# Patient Record
Sex: MALE | Race: WHITE
[De-identification: names, ages, dates, MRNs, and addresses within clinical notes are randomized per-mention and may not be internally consistent; named-entity substitution may affect disease eponyms.]

---

## 2018-04-09 ENCOUNTER — HOSPITAL ENCOUNTER (OUTPATIENT)
Dept: HOSPITAL 45 - C.LABMFLN | Age: 63
Discharge: HOME | End: 2018-04-09
Attending: FAMILY MEDICINE
Payer: COMMERCIAL

## 2018-04-09 DIAGNOSIS — C61: Primary | ICD-10-CM

## 2018-04-09 LAB
BUN SERPL-MCNC: 20 MG/DL (ref 7–18)
CREAT SERPL-MCNC: 0.81 MG/DL (ref 0.6–1.4)

## 2018-04-17 ENCOUNTER — HOSPITAL ENCOUNTER (OUTPATIENT)
Dept: HOSPITAL 45 - C.MRI | Age: 63
Discharge: HOME | End: 2018-04-17
Attending: UROLOGY
Payer: COMMERCIAL

## 2018-04-17 DIAGNOSIS — C61: Primary | ICD-10-CM

## 2018-04-17 LAB
BASOPHILS # BLD: 0.01 K/UL (ref 0–0.2)
BASOPHILS NFR BLD: 0.2 %
BUN SERPL-MCNC: 14 MG/DL (ref 7–18)
CALCIUM SERPL-MCNC: 9.2 MG/DL (ref 8.5–10.1)
CO2 SERPL-SCNC: 29 MMOL/L (ref 21–32)
CREAT SERPL-MCNC: 0.88 MG/DL (ref 0.6–1.4)
EOS ABS #: 0.05 K/UL (ref 0–0.5)
EOSINOPHIL NFR BLD AUTO: 262 K/UL (ref 130–400)
GLUCOSE SERPL-MCNC: 99 MG/DL (ref 70–99)
HCT VFR BLD CALC: 44 % (ref 42–52)
HGB BLD-MCNC: 15 G/DL (ref 14–18)
IG#: 0.01 K/UL (ref 0–0.02)
IMM GRANULOCYTES NFR BLD AUTO: 19.4 %
LYMPHOCYTES # BLD: 1.05 K/UL (ref 1.2–3.4)
MCH RBC QN AUTO: 30.4 PG (ref 25–34)
MCHC RBC AUTO-ENTMCNC: 34.1 G/DL (ref 32–36)
MCV RBC AUTO: 89.1 FL (ref 80–100)
MONO ABS #: 0.4 K/UL (ref 0.11–0.59)
MONOCYTES NFR BLD: 7.4 %
NEUT ABS #: 3.88 K/UL (ref 1.4–6.5)
NEUTROPHILS # BLD AUTO: 0.9 %
NEUTROPHILS NFR BLD AUTO: 71.9 %
PMV BLD AUTO: 9.2 FL (ref 7.4–10.4)
POTASSIUM SERPL-SCNC: 4.2 MMOL/L (ref 3.5–5.1)
RED CELL DISTRIBUTION WIDTH CV: 12.7 % (ref 11.5–14.5)
RED CELL DISTRIBUTION WIDTH SD: 40.9 FL (ref 36.4–46.3)
SODIUM SERPL-SCNC: 135 MMOL/L (ref 136–145)
WBC # BLD AUTO: 5.4 K/UL (ref 4.8–10.8)

## 2018-04-17 NOTE — DIAGNOSTIC IMAGING REPORT
PELVIC COMBO



CLINICAL HISTORY: 62 years-old Male presenting with CARCINOMA OF THE PROSTATE. 



TECHNIQUE: Multisequence, multiplanar MR imaging of the pelvis was performed

before and after the administration of intravenous contrast. IV contrast: 6.5 mL

of Gadavist. 



COMPARISON: CT from 12/30/2017.



FINDINGS:



Localizer images: Unremarkable.



Bone marrow signal intensity normal.



Visualized portion of the scrotum normal. Fat-containing right inguinal hernia.



Allowing for underdistention, mild circumferential bladder wall thickening could

suggest chronic bladder outlet obstruction.



Prostate demonstrates mild changes of benign prostatic hyperplasia. No evidence

of extraprostatic extension. Few scattered T1 hyperintense foci in the prostate

on precontrast imaging may relate to calcification. No significant evidence of

hemorrhage.



No pelvic lymphadenopathy.



IMPRESSION:

1.  Benign prostatic hyperplasia. No kamari evidence of extraprostatic extension

or pelvic lymphadenopathy.







Electronically signed by:  Jarret Aguilera M.D.

4/17/2018 2:38 PM



Dictated Date/Time:  4/17/2018 2:31 PM

## 2018-04-17 NOTE — DIAGNOSTIC IMAGING REPORT
CHEST 2 VIEWS ROUTINE



CLINICAL HISTORY: PAT preoperative evaluation



COMPARISON STUDY:  No previous studies for comparison.



FINDINGS: The bones soft tissues and hemidiaphragms are normal. The

cardiomediastinal silhouette is normal. The lungs are clear. The pulmonary

vasculature is normal. 



IMPRESSION:  Negative chest. 











The above report was generated using voice recognition software.  It may contain

grammatical, syntax or spelling errors.









Electronically signed by:  Jace Sarmiento M.D.

4/17/2018 11:10 AM



Dictated Date/Time:  4/17/2018 11:10 AM

## 2018-05-01 ENCOUNTER — HOSPITAL ENCOUNTER (INPATIENT)
Dept: HOSPITAL 45 - C.ACU | Age: 63
LOS: 1 days | Discharge: HOME | DRG: 708 | End: 2018-05-02
Attending: UROLOGY | Admitting: UROLOGY
Payer: COMMERCIAL

## 2018-05-01 VITALS
HEART RATE: 55 BPM | OXYGEN SATURATION: 97 % | DIASTOLIC BLOOD PRESSURE: 61 MMHG | TEMPERATURE: 97.7 F | SYSTOLIC BLOOD PRESSURE: 100 MMHG

## 2018-05-01 VITALS
OXYGEN SATURATION: 97 % | SYSTOLIC BLOOD PRESSURE: 117 MMHG | HEART RATE: 52 BPM | TEMPERATURE: 98.06 F | DIASTOLIC BLOOD PRESSURE: 86 MMHG

## 2018-05-01 VITALS
WEIGHT: 149.03 LBS | WEIGHT: 149.03 LBS | HEIGHT: 66 IN | BODY MASS INDEX: 23.95 KG/M2 | BODY MASS INDEX: 23.95 KG/M2 | BODY MASS INDEX: 23.95 KG/M2 | HEIGHT: 66 IN

## 2018-05-01 VITALS
HEART RATE: 56 BPM | SYSTOLIC BLOOD PRESSURE: 94 MMHG | OXYGEN SATURATION: 98 % | TEMPERATURE: 97.7 F | DIASTOLIC BLOOD PRESSURE: 62 MMHG

## 2018-05-01 VITALS
SYSTOLIC BLOOD PRESSURE: 93 MMHG | HEART RATE: 65 BPM | OXYGEN SATURATION: 99 % | TEMPERATURE: 98.42 F | DIASTOLIC BLOOD PRESSURE: 61 MMHG

## 2018-05-01 VITALS
TEMPERATURE: 97.34 F | SYSTOLIC BLOOD PRESSURE: 94 MMHG | HEART RATE: 58 BPM | DIASTOLIC BLOOD PRESSURE: 62 MMHG | OXYGEN SATURATION: 97 %

## 2018-05-01 VITALS
DIASTOLIC BLOOD PRESSURE: 60 MMHG | SYSTOLIC BLOOD PRESSURE: 96 MMHG | OXYGEN SATURATION: 95 % | TEMPERATURE: 98.24 F | HEART RATE: 61 BPM

## 2018-05-01 VITALS
DIASTOLIC BLOOD PRESSURE: 66 MMHG | TEMPERATURE: 98.78 F | SYSTOLIC BLOOD PRESSURE: 106 MMHG | OXYGEN SATURATION: 94 % | HEART RATE: 59 BPM

## 2018-05-01 DIAGNOSIS — C61: Primary | ICD-10-CM

## 2018-05-01 DIAGNOSIS — K42.9: ICD-10-CM

## 2018-05-01 LAB
BUN SERPL-MCNC: 21 MG/DL (ref 7–18)
CALCIUM SERPL-MCNC: 8.1 MG/DL (ref 8.5–10.1)
CO2 SERPL-SCNC: 26 MMOL/L (ref 21–32)
CREAT SERPL-MCNC: 0.83 MG/DL (ref 0.6–1.4)
EOSINOPHIL NFR BLD AUTO: 217 K/UL (ref 130–400)
GLUCOSE SERPL-MCNC: 165 MG/DL (ref 70–99)
HCT VFR BLD CALC: 38.6 % (ref 42–52)
HGB BLD-MCNC: 13.3 G/DL (ref 14–18)
INR PPP: 1.1 (ref 0.9–1.1)
MCH RBC QN AUTO: 30.1 PG (ref 25–34)
MCHC RBC AUTO-ENTMCNC: 34.2 G/DL (ref 32–36)
MCV RBC AUTO: 88 FL (ref 80–100)
PMV BLD AUTO: 8.7 FL (ref 7.4–10.4)
POTASSIUM SERPL-SCNC: 3.3 MMOL/L (ref 3.5–5.1)
PTT PATIENT: 24.4 SECONDS (ref 21–31)
RED CELL DISTRIBUTION WIDTH CV: 12.8 % (ref 11.5–14.5)
RED CELL DISTRIBUTION WIDTH SD: 41.5 FL (ref 36.4–46.3)
SODIUM SERPL-SCNC: 140 MMOL/L (ref 136–145)
WBC # BLD AUTO: 12.32 K/UL (ref 4.8–10.8)

## 2018-05-01 PROCEDURE — 0VT04ZZ RESECTION OF PROSTATE, PERCUTANEOUS ENDOSCOPIC APPROACH: ICD-10-PCS | Performed by: UROLOGY

## 2018-05-01 PROCEDURE — 0VT34ZZ RESECTION OF BILATERAL SEMINAL VESICLES, PERCUTANEOUS ENDOSCOPIC APPROACH: ICD-10-PCS | Performed by: UROLOGY

## 2018-05-01 PROCEDURE — 07BC4ZX EXCISION OF PELVIS LYMPHATIC, PERCUTANEOUS ENDOSCOPIC APPROACH, DIAGNOSTIC: ICD-10-PCS | Performed by: UROLOGY

## 2018-05-01 PROCEDURE — 0WQF0ZZ REPAIR ABDOMINAL WALL, OPEN APPROACH: ICD-10-PCS | Performed by: UROLOGY

## 2018-05-01 PROCEDURE — 8E0W4CZ ROBOTIC ASSISTED PROCEDURE OF TRUNK REGION, PERCUTANEOUS ENDOSCOPIC APPROACH: ICD-10-PCS | Performed by: UROLOGY

## 2018-05-01 RX ADMIN — HEPARIN SODIUM SCH UNIT: 10000 INJECTION, SOLUTION INTRAVENOUS; SUBCUTANEOUS at 18:55

## 2018-05-01 RX ADMIN — ACETAMINOPHEN SCH MG: 500 TABLET, COATED ORAL at 23:38

## 2018-05-01 RX ADMIN — ACETAMINOPHEN SCH MG: 500 TABLET, COATED ORAL at 17:47

## 2018-05-01 RX ADMIN — DOCUSATE SODIUM SCH MG: 100 CAPSULE, LIQUID FILLED ORAL at 20:53

## 2018-05-01 RX ADMIN — SODIUM CHLORIDE, SODIUM LACTATE, POTASSIUM CHLORIDE, AND CALCIUM CHLORIDE SCH MLS/HR: 600; 310; 30; 20 INJECTION, SOLUTION INTRAVENOUS at 17:03

## 2018-05-01 RX ADMIN — SODIUM CHLORIDE, SODIUM LACTATE, POTASSIUM CHLORIDE, AND CALCIUM CHLORIDE SCH MLS/HR: 600; 310; 30; 20 INJECTION, SOLUTION INTRAVENOUS at 23:37

## 2018-05-01 RX ADMIN — CEFAZOLIN SCH MLS/MIN: 10 INJECTION, POWDER, FOR SOLUTION INTRAVENOUS at 23:37

## 2018-05-01 RX ADMIN — CEFAZOLIN SCH MLS/MIN: 10 INJECTION, POWDER, FOR SOLUTION INTRAVENOUS at 15:42

## 2018-05-01 NOTE — ANESTHESIOLOGY PROGRESS NOTE
Anesthesia Post Op Note


Date & Time


May 1, 2018 at 13:08





Vital Signs


Pain Intensity:  3





Vital Signs Past 12 Hours








  Date Time  Temp Pulse Resp B/P (MAP) Pulse Ox O2 Delivery O2 Flow Rate FiO2


 


5/1/18 12:47  51 19     


 


5/1/18 12:47  51 19  99   


 


5/1/18 12:46    98/60    


 


5/1/18 12:44 36.4 51 16 98/60 (72) 100 Nasal Cannula 2 


 


5/1/18 12:42  53 17 101/57 96   


 


5/1/18 12:42  52 17     


 


5/1/18 12:41    94/59    


 


5/1/18 12:37  52 13     


 


5/1/18 12:37  52 13  98   


 


5/1/18 12:36    99/62    


 


5/1/18 12:32  54 23     


 


5/1/18 12:32  54 23  93   


 


5/1/18 12:31    102/62    


 


5/1/18 12:27  51 18  97   


 


5/1/18 12:27  52 18     


 


5/1/18 12:26  53 17 94/59 100   


 


5/1/18 12:26  53 17     


 


5/1/18 12:21  53 23     


 


5/1/18 12:21  53 23 94/63 94   


 


5/1/18 12:18    105/65    


 


5/1/18 12:17    92/57    


 


5/1/18 12:16  54 17 90/58 100   


 


5/1/18 12:16  53 17     


 


5/1/18 12:11  55 20     


 


5/1/18 12:11  55 20 102/61 99   


 


5/1/18 12:09    102/61    


 


5/1/18 12:07    100/58    


 


5/1/18 12:06 36.0 58 18 102/61 (66) 100 Oxymask 10 


 


5/1/18 12:06  64      


 


5/1/18 12:06  64   100   


 


5/1/18 05:47 36.7 52 18 117/86 (96) 97 Room Air  











Notes


Mental Status:  alert / awake / arousable, participated in evaluation


Pt Amnestic to Procedure:  Yes


Nausea / Vomiting:  adequately controlled


Pain:  adequately controlled


Airway Patency, RR, SpO2:  stable & adequate


BP & HR:  stable & adequate


Hydration State:  stable & adequate


Anesthetic Complications:  no major complications apparent

## 2018-05-01 NOTE — [facility] OPERATIVE REPORT
Operative Report


Operative Date


May 1, 2018.





Pre-Operative Diagnosis





1. Prostate Cancer





2. Umbilical hernia





Post-Operative Diagnosis





1. Prostate Cancer





2.  Umbilical hernia





Procedure(s) Performed





Robotic Assisted Laparoscopic Prostatectomy; closure of umbilical hernia





Surgeon


Dr. Andre Armenta





Assistant Surgeon(s)


ADRIAN Dudley





Estimated Blood Loss


175 ML





Specimens





Permanent:





A. Right Pelvic Lymph Node (fresh)





B. Dionne-prostatic fat





C. Left Pelvic Lymph Node (fresh)





D. Prostate and Seminal Vesicle





Drains


David catheter





Anesthesia Type


General





Complication(s)


none





Disposition


yes


Recovery Room / PACU (stable)





Indications


Prostate cancer





Description of Procedure


The patient was identified in the preoperative holding area, appropriate 

informed consents were reviewed and completed, and he was transported to the 

operating suite.  Subcutaneous heparin was administered in the pre-operative 

holding area.  Upon arrival in the operating suite, he received appropriate 

antibiotics and general anesthesia.  He was positioned in dorsal lithotomy, a B&

O suppository was inserted after digital rectal exam, and he was prepped and 

draped in standard fashion.





A David catheter was inserted in the sterile field.  A Veress needle was passed 

per umbilicus with uniform insufflation of the abdomen to 12mmHg.  He was 

placed in steep Trendelenburg position.  A periumbilical incision was then made 

to accommodate a 12mm Visiport with 10mm 0degree laparoscope.  Inspection of 

the abdomen was carried out, and there was no evidence of traumatic entry or 

injury secondary to the Veress needle.  After confirming a clear anterior 

abdominal wall, ports were subsequently placed in standard robotic 

prostatectomy fashion without incident.





To begin the robotic portion of the case, the left lateral aspect of the 

sigmoid was mobilized off of the left pelvic side wall to allow the pouch of 

Zachary to be appropriately visualized.  Of note, he is status post prior left 

inguinal hernia repair, and mesh was visible protruding from the left internal 

ring.  There was moderate adhesive disease around this and I carefully 

dissected all adjacent tissues off of the mesh.





The medial umbilical ligaments were then controlled with bipolar electrocautery 

just inferior to the umbilicus.  Following cauterization, they were divided 

utilizing monopolar cautery. A peritoneal incision was carried from this 

location to the medial aspect of the internal inguinal rings bilaterally with 

care to avoid opening through the ring.  This incision was concluded when the 

vas deferens was reached.  Dissection of the bladder and prostate off of the 

posterior aspect of the pubic arch was completed allowing full visualization of 

the prostate.  The fat overlying the prostate was removed en bloc and passed 

off the table as a specimen labeled "periprostatic fat".  The endopelvic fascia 

was cleared during this portion of the procedure, and subsequently opened - 

first on the right and then the left.  The incision through the endopelvic 

fascia began near the prostate-bladder junction and was carried to the apex 

with extreme care to preserve all lateral levator musculature as well as the 

periurethral musculature and sphincter complex.  The puboprostatic ligaments 

were thinned slightly bilaterally before placing a 0-Vicryl figure of 8 stitch 

around the DVC.





The lymph node dissection was then conducted.  External iliac vessels were 

identified on the pelvic side wall. The packet of fat and lymphatic tissue that 

resides just under the iliac vein was elevated and  off of the vein 

with a split and roll technique.  The packet was dissected laterally to the 

circumflex vein and distally to the obturator nerve which was preserved.  The 

proximal aspect of the packet was carried towards the bifurcation of the iliac 

vessels. A combination of monopolar and bipolar cautery were used  to assist 

with control.  Clips were placed at the proximal and distal aspects of the 

packet prior to transection.  After completing the dissection on both sides, 

the packets were collected and passed off of the table as specimens labeled 

"pelvic lymph nodes".





My attention then returned to the prostate, with identification of the bladder 

neck aided by gentle traction on the David catheter and lateral to medial 

pressure at the presumed level of the bladder neck with the robotic 

instruments.  An anterior cystotomy was made, the David balloon deflated and 

the catheter guided through the incision to allow anterior retraction.  I 

attempted to preserve maximal bladder neck musculature as I circumferentially 

dissected around the bladder neck.  After incision through the posterior aspect 

of the mucosa, the dissection was carried through detrusor muscle until the 

bilateral ampullae of the vasa were identified.  After identifying the vasa, I 

developed a pedicle packet on each side to help flatten the dissection and 

placed Weck clips across the most proximal and superficial aspects of these 

packets adjacent to the bladder.  The packets were then divided allowing easier 

visualization of the vasa and posterior aspect of the prostate.





Vasa were each dissected before being transected.  These were used to further 

aide in anterior retraction as the bilateral seminal vesicals were dissected 

with very judicious use of bipolar electrocautery.  Following SV dissection, a 

posterior plane behind the prostate was developed  - splitting Denonvilliers's 

fascia.  This dissection was carried as far as possible towards the apex as 

well as far as possible laterally.





An incision in the lateral prostatic fascia was then made bilaterally to 

facilitate control of the vascular pedicles.  The pedicles were each controlled 

with a series of Weck clips.  The neurovascular bundles were identified and 

preserved, with increased caution adjacent to the area of cancer identified on 

biopsy.





The apical attachments of the prostate were remaining at that stage.  The DVC 

was divided with bipolar electrocautery.  Dionne-prostatic tissue incised with 

sharp dissection and monopolar cautery.  Maximal urethral length was preserved 

before dividing the urethra sharply.





The prostate was entirely freed at that point, and collected in an EndoCatch 

bag before being moved out of the field of vision. Hemostasis was confirmed and 

anastomosis of the bladder and urethra was completed utilizing a double armed V-

Lock stitch.  A new David catheter was inserted and the anastomosis tested with 

irrigation. There was no evidence of leak.  Tisseel coagulant was placed over 

the lymph node dissection areas as well as around the anastomosis before 

additional placement of FloSeal coagulant around the anastomosis.





The robot was undocked, the specimen extracted through expansion of the dionne-

umbilical camera port.  Of note, he has small umbilical hernia, and I elected 

to open the fascia through this hernia and incorporated into the closure to 

completely repair the hernia.  The fascia was closed with a series of 0-PDS 

figure of 8 stitches.  The right assistant port was closed in two layers  - 

with a figure of 8 0-Vicryl to reapproximate the fascia followed by 4-0 

Monocryl to close the skin. Monocryl was used to close all other skin 

incisions.  All wounds were dressed with Dermabond.





The case was concluded and the patient taken to the PACU in stable condition.


I attest to the content of the Intraoperative Record and any orders documented 

therein.  Any exceptions are noted below.

## 2018-05-01 NOTE — [facility] POST OPERATIVE BRIEF NOTE
Immediate Operative Summary


Operative Date


May 1, 2018.





Pre-Operative Diagnosis





1. Prostate Cancer





2. Elevated Prostate Specific Antigen





Post-Operative Diagnosis





1. Prostate Cancer





2. Elevated Prostate Specific Antigen





Procedure(s) Performed





Robotic Assisted Laparoscopic Prostatectomy





Surgeon


Dr. Andre Armenta





Assistant Surgeon(s)


ADRIAN Dudley





Estimated Blood Loss


175 ML





Findings


Consistent with Post-Op Diagnosis





Specimens





Permanent:





A. Right Pelvic Lymph Node (fresh)





B. Dionne-prostatic fat





C. Left Pelvic Lymph Node (fresh)





D. Prostate and Seminal Vesicle





Anesthesia Type


General





Complication(s)


none





Disposition


Accompanied Pt To Recover:  yes


Disposition:  Recovery Room / PACU (stable)

## 2018-05-01 NOTE — DISCHARGE INSTRUCTIONS
Discharge Instructions


Date of Service


May 1, 2018.





Admission


Reason for Admission:  Prostate Cancer





Discharge


Discharge Diagnosis / Problem:  Prostate Cancer





Discharge Goals


Goal(s):  Decrease discomfort, Improve disease control, Therapeutic intervention





Activity Recommendations


Activity Limitations:  per Instructions/Follow-up section


Shower/Bathe:  tomorrow





1.  Do not lift >15lbs x 6 weeks. 





2.  No heavy exercise x 6 weeks. You may engage in light activity such as 

walking and stairs as tolerated. 





3.  No sexual intercourse until cleared by Dr. Armenta. 





4.  Do not drive x 1 week. Do not drive while taking narcotics. 





5.  You have been prescribed the antibiotic Ciprofloxacin. Please start this 2 

days prior to bourne catheter removal. Finish all of the antibiotic you have 

been prescribed. 





6.  Immediately call our office at [phone] if your catheter is removed for 

any reason. 





7.  Follow-up as scheduled. Please call our office at [phone] if you need 

to reschedule for any reason.


. 











.





Current Hospital Diet


Patient's current hospital diet: Clear Liquid Diet





Discharge Diet


Recommended Diet:  Regular Diet





Procedures


Procedures Performed:  


Robotic Assisted Laparoscopic Prostatectomy





Pending Studies


Studies pending at discharge:  yes


List of pending studies:  


prostate and lymph node pathology





Medical Emergencies








.


Who to Call and When:





Medical Emergencies:  If at any time you feel your situation is an emergency, 

please call 911 immediately.





.





Non-Emergent Contact


Non-Emergency issues call your:  Urologist


Call Non-Emergent contact if:  temperature is above 101.5, your pain is not 

controlled, your pain is worsening, your pain is unusual for you, your pain is 

concerning you, wound has increased drainage, wound has increased redness, 

wound has increased pain, you have any medication questions





.


.








"Provider Documentation" section prepared by Evelyne Back.








.





[place] Drug Monitoring Program


Search Results:  patient reviewed within database, no issues identified

## 2018-05-02 VITALS
SYSTOLIC BLOOD PRESSURE: 111 MMHG | TEMPERATURE: 98.42 F | HEART RATE: 62 BPM | DIASTOLIC BLOOD PRESSURE: 65 MMHG | OXYGEN SATURATION: 96 %

## 2018-05-02 VITALS
OXYGEN SATURATION: 97 % | HEART RATE: 70 BPM | TEMPERATURE: 98.78 F | SYSTOLIC BLOOD PRESSURE: 130 MMHG | DIASTOLIC BLOOD PRESSURE: 72 MMHG

## 2018-05-02 VITALS
DIASTOLIC BLOOD PRESSURE: 65 MMHG | TEMPERATURE: 99.14 F | SYSTOLIC BLOOD PRESSURE: 120 MMHG | OXYGEN SATURATION: 93 % | HEART RATE: 61 BPM

## 2018-05-02 VITALS
HEART RATE: 62 BPM | TEMPERATURE: 98.42 F | DIASTOLIC BLOOD PRESSURE: 65 MMHG | SYSTOLIC BLOOD PRESSURE: 111 MMHG | OXYGEN SATURATION: 96 %

## 2018-05-02 LAB
BASOPHILS # BLD: 0.01 K/UL (ref 0–0.2)
BASOPHILS NFR BLD: 0.1 %
BUN SERPL-MCNC: 13 MG/DL (ref 7–18)
CALCIUM SERPL-MCNC: 8.3 MG/DL (ref 8.5–10.1)
CO2 SERPL-SCNC: 31 MMOL/L (ref 21–32)
CREAT SERPL-MCNC: 0.83 MG/DL (ref 0.6–1.4)
EOS ABS #: 0.01 K/UL (ref 0–0.5)
EOSINOPHIL NFR BLD AUTO: 218 K/UL (ref 130–400)
GLUCOSE SERPL-MCNC: 101 MG/DL (ref 70–99)
HCT VFR BLD CALC: 36.3 % (ref 42–52)
HGB BLD-MCNC: 12.3 G/DL (ref 14–18)
IG#: 0.02 K/UL (ref 0–0.02)
IMM GRANULOCYTES NFR BLD AUTO: 13.9 %
LYMPHOCYTES # BLD: 1.43 K/UL (ref 1.2–3.4)
MCH RBC QN AUTO: 30.2 PG (ref 25–34)
MCHC RBC AUTO-ENTMCNC: 33.9 G/DL (ref 32–36)
MCV RBC AUTO: 89.2 FL (ref 80–100)
MONO ABS #: 0.91 K/UL (ref 0.11–0.59)
MONOCYTES NFR BLD: 8.8 %
NEUT ABS #: 7.91 K/UL (ref 1.4–6.5)
NEUTROPHILS # BLD AUTO: 0.1 %
NEUTROPHILS NFR BLD AUTO: 76.9 %
PMV BLD AUTO: 9.1 FL (ref 7.4–10.4)
POTASSIUM SERPL-SCNC: 3.9 MMOL/L (ref 3.5–5.1)
RED CELL DISTRIBUTION WIDTH CV: 13.1 % (ref 11.5–14.5)
RED CELL DISTRIBUTION WIDTH SD: 42.7 FL (ref 36.4–46.3)
SODIUM SERPL-SCNC: 140 MMOL/L (ref 136–145)
WBC # BLD AUTO: 10.29 K/UL (ref 4.8–10.8)

## 2018-05-02 RX ADMIN — SODIUM CHLORIDE, SODIUM LACTATE, POTASSIUM CHLORIDE, AND CALCIUM CHLORIDE SCH MLS/HR: 600; 310; 30; 20 INJECTION, SOLUTION INTRAVENOUS at 05:53

## 2018-05-02 RX ADMIN — CEFAZOLIN SCH MLS/MIN: 10 INJECTION, POWDER, FOR SOLUTION INTRAVENOUS at 07:42

## 2018-05-02 RX ADMIN — ACETAMINOPHEN SCH MG: 500 TABLET, COATED ORAL at 11:26

## 2018-05-02 RX ADMIN — ACETAMINOPHEN SCH MG: 500 TABLET, COATED ORAL at 05:53

## 2018-05-02 RX ADMIN — DOCUSATE SODIUM SCH MG: 100 CAPSULE, LIQUID FILLED ORAL at 07:51

## 2018-05-02 RX ADMIN — HEPARIN SODIUM SCH UNIT: 10000 INJECTION, SOLUTION INTRAVENOUS; SUBCUTANEOUS at 06:15

## 2018-05-02 NOTE — CLINICAL DOCUMENTATION QUERY
**** CLINICAL DOCUMENTATION QUERY****



Dr. HELM, 



In your clinical opinion is this patient being managed for:

    

                        (  ) Acute blood loss anemia



                        (  ) Not Agree



                        (x  ) Other explanation of clinical findings (Please Explain. If 
no explanation given, this would be considered a no response.) (dilution from IVF and 
modest operative blood loss)



                        (  ) Unable to determine 



                        (  ) Need to Discuss (Please call CDS via extension or qliq. If no 
interaction occurs this is considered a no response.)

                        

The medical record reflects the following clinical findings, treatment, and risk factors.  
  



Clinical Indicators: 63 yo male presenting with prostate cancer and an umbilical hernia. 
Baseline Hgb 15/Hct 44 dropping to 12.3/36.3 postoperatively. EBL of 175 cc

Treatment: IV fluids, serial CBC's

Risk Factors: surgical blood loss



Please clarify and document your clinical opinion in the progress notes and discharge 
summary. Terms such as "probable", "suspected", "likely", "questionable", "possible", or 
"still to be ruled out" are acceptable. 



*****IF IN AGREEMENT, YOU MUST DOCUMENT ABOVE DIAGNOSTIC STATEMENT IN DAILY PROGRESS NOTES 
AND DISCHARGE SUMMARY. This document is not part of the patient's record.*****

Thank You, Poly Recio [phone]

## 2018-05-02 NOTE — PROGRESS NOTE
Subjective


Date of Service:


May 2, 2018.


Subjective


Pt evaluation today including:  conversation w/ patient, physical exam, lab 

review


Voiding:  bourne catheter in place


Progressing very well post operatively


- ambulating


- tolerating a diet


- minimal pain


- very content with his current status





Review of Systems


Abdomen:  + pain (appropriate post operative pain)





Objective


Vital Signs











  Date Time  Temp Pulse Resp B/P (MAP) Pulse Ox O2 Delivery O2 Flow Rate FiO2


 


5/2/18 08:22 36.9 62 16 111/65 (80) 96 Room Air  


 


5/2/18 03:02 37.3 61 15 120/65 (83) 93 Room Air  


 


5/1/18 23:47      Room Air  


 


5/1/18 23:35 37.1 59 16 106/66 (79) 94 Room Air  


 


5/1/18 16:56 36.9 65 18 93/61 (72) 99 Room Air  


 


5/1/18 15:40 36.8 61 18 96/60 (72) 95 Room Air  


 


5/1/18 15:20      Room Air  


 


5/1/18 14:25 36.3 58 19 94/62 (73) 97 Nasal Cannula 2.0 


 


5/1/18 13:51 36.5 56 15 94/62 (73) 98 Nasal Cannula 2.0 


 


5/1/18 13:20 36.5 55 16 100/61 (74) 97 Nasal Cannula 2.0 


 


5/1/18 13:20      Nasal Cannula 2.0 


 


5/1/18 13:20     97 Nasal Cannula 2.0 


 


5/1/18 12:47  51 19     


 


5/1/18 12:47  51 19  99   


 


5/1/18 12:46    98/60    


 


5/1/18 12:44 36.4 51 16 98/60 (72) 100 Nasal Cannula 2 


 


5/1/18 12:42  53 17 101/57 96   


 


5/1/18 12:42  52 17     


 


5/1/18 12:41    94/59    


 


5/1/18 12:37  52 13     


 


5/1/18 12:37  52 13  98   


 


5/1/18 12:36    99/62    


 


5/1/18 12:32  54 23     


 


5/1/18 12:32  54 23  93   


 


5/1/18 12:31    102/62    


 


5/1/18 12:27  51 18  97   


 


5/1/18 12:27  52 18     


 


5/1/18 12:26  53 17 94/59 100   


 


5/1/18 12:26  53 17     


 


5/1/18 12:21  53 23     


 


5/1/18 12:21  53 23 94/63 94   


 


5/1/18 12:18    105/65    


 


5/1/18 12:17    92/57    


 


5/1/18 12:16  54 17 90/58 100   


 


5/1/18 12:16  53 17     


 


5/1/18 12:11  55 20     


 


5/1/18 12:11  55 20 102/61 99   


 


5/1/18 12:09    102/61    


 


5/1/18 12:07    100/58    


 


5/1/18 12:06 36.0 58 18 102/61 (66) 100 Oxymask 10 


 


5/1/18 12:06  64      


 


5/1/18 12:06  64   100   











Physical Exam


General Appearance:  WD/WN, no apparent distress


Eyes:  normal inspection, PERRL


ENT:  hearing grossly normal


Neck:  no adenopathy


Cardiovascular:  no edema


Abdomen:  soft (minimal hematoma under a right sided port site, other incisions 

appear appropriate - clear urine)


Neurologic/Psychiatric:  alert, normal mood/affect, oriented x 3





Laboratory Results





Last 24 Hours








Test


  5/1/18


12:10 5/1/18


15:06 5/2/18


07:19


 


White Blood Count 12.32 K/uL   10.29 K/uL 


 


Red Blood Count 4.42 M/uL   4.07 M/uL 


 


Hemoglobin 13.3 g/dL   12.3 g/dL 


 


Hematocrit 38.9 %   36.3 % 


 


Mean Corpuscular Volume 88.0 fL   89.2 fL 


 


Mean Corpuscular Hemoglobin 30.1 pg   30.2 pg 


 


Mean Corpuscular Hemoglobin


Concent 34.2 g/dl 


  


  33.9 g/dl 


 


 


RDW Standard Deviation 41.5 fL   42.7 fL 


 


RDW Coefficient of Variation 12.8 %   13.1 % 


 


Platelet Count 217 K/uL   218 K/uL 


 


Mean Platelet Volume 8.7 fL   9.1 fL 


 


Sodium Level 140 mmol/L   140 mmol/L 


 


Potassium Level 3.3 mmol/L   3.9 mmol/L 


 


Chloride Level 108 mmol/L   105 mmol/L 


 


Carbon Dioxide Level 26 mmol/L   31 mmol/L 


 


Anion Gap 6.0 mmol/L   4.0 mmol/L 


 


Blood Urea Nitrogen 21 mg/dl   13 mg/dl 


 


Creatinine 0.83 mg/dl   0.83 mg/dl 


 


Est Creatinine Clear Calc


Drug Dose 85.4 ml/min 


  


  85.4 ml/min 


 


 


Estimated GFR (


American) 110.8 


  


  110.8 


 


 


Estimated GFR (Non-


American 95.6 


  


  95.6 


 


 


BUN/Creatinine Ratio 25.1   15.4 


 


Random Glucose 165 mg/dl   101 mg/dl 


 


Calcium Level 8.1 mg/dl   8.3 mg/dl 


 


Prothrombin Time  11.1 SECONDS  


 


Prothromb Time International


Ratio 


  1.1 


  


 


 


Activated Partial


Thromboplast Time 


  24.4 SECONDS 


  


 


 


Partial Thromboplastin Ratio  0.9  


 


Neutrophils (%) (Auto)   76.9 % 


 


Lymphocytes (%) (Auto)   13.9 % 


 


Monocytes (%) (Auto)   8.8 % 


 


Eosinophils (%) (Auto)   0.1 % 


 


Basophils (%) (Auto)   0.1 % 


 


Neutrophils # (Auto)   7.91 K/uL 


 


Lymphocytes # (Auto)   1.43 K/uL 


 


Monocytes # (Auto)   0.91 K/uL 


 


Eosinophils # (Auto)   0.01 K/uL 


 


Basophils # (Auto)   0.01 K/uL 


 


Immature Granulocyte % (Auto)   0.2 % 


 


Immature Granulocyte # (Auto)   0.02 K/uL 











Assessment and Plan


POD#1 s/p RALP with LND





- cont ambulation


- advance diet


- assuming he continues to do well this AM, plan for home after lunch

## 2018-05-02 NOTE — CLINICAL DOCUMENTATION QUERY
**** CLINICAL DOCUMENTATION QUERY****





Ms. SANTOS, 



In your clinical opinion is this patient being managed for:

    

                        (  ) Acute blood loss anemia



                        (  ) Not Agree



                        (  ) Other explanation of clinical findings (Please Explain. If no 
explanation given, this would be considered a no response.)



                        (  ) Unable to determine 



                        (  ) Need to Discuss (Please call CDS via extension or qliq. If no 
interaction occurs this is considered a no response.)

                        

The medical record reflects the following clinical findings, treatment, and risk factors.  
  



Clinical Indicators: 63 yo male presenting with prostate cancer and an umbilical hernia. 
Baseline Hgb 15/Hct 44 dropping to 12.3/36.3 postoperatively. EBL of 175 cc

Treatment: IV fluids, serial CBC's

Risk Factors: surgical blood loss



Please clarify and document your clinical opinion in the progress notes and discharge 
summary. Terms such as "probable", "suspected", "likely", "questionable", "possible", or 
"still to be ruled out" are acceptable. 



*****IF IN AGREEMENT, YOU MUST DOCUMENT ABOVE DIAGNOSTIC STATEMENT IN DAILY PROGRESS NOTES 
AND DISCHARGE SUMMARY. This document is not part of the patient's record.*****



Thank You, Poly Recio -[phone]

## 2018-08-01 ENCOUNTER — HOSPITAL ENCOUNTER (OUTPATIENT)
Dept: HOSPITAL 45 - C.LABMFLN | Age: 63
Discharge: HOME | End: 2018-08-01
Attending: UROLOGY
Payer: COMMERCIAL

## 2018-08-01 DIAGNOSIS — C61: Primary | ICD-10-CM

## 2018-08-01 DIAGNOSIS — H61.23: ICD-10-CM
